# Patient Record
Sex: FEMALE
[De-identification: names, ages, dates, MRNs, and addresses within clinical notes are randomized per-mention and may not be internally consistent; named-entity substitution may affect disease eponyms.]

---

## 2021-06-29 ENCOUNTER — APPOINTMENT (OUTPATIENT)
Dept: OBGYN | Facility: CLINIC | Age: 61
End: 2021-06-29
Payer: MEDICAID

## 2021-06-29 VITALS
DIASTOLIC BLOOD PRESSURE: 80 MMHG | HEIGHT: 65 IN | WEIGHT: 132 LBS | SYSTOLIC BLOOD PRESSURE: 130 MMHG | BODY MASS INDEX: 21.99 KG/M2

## 2021-06-29 DIAGNOSIS — Z01.419 ENCOUNTER FOR GYNECOLOGICAL EXAMINATION (GENERAL) (ROUTINE) W/OUT ABNORMAL FINDINGS: ICD-10-CM

## 2021-06-29 DIAGNOSIS — N76.0 ACUTE VAGINITIS: ICD-10-CM

## 2021-06-29 DIAGNOSIS — Z11.3 ENCOUNTER FOR SCREENING FOR INFECTIONS WITH A PREDOMINANTLY SEXUAL MODE OF TRANSMISSION: ICD-10-CM

## 2021-06-29 DIAGNOSIS — B96.89 ACUTE VAGINITIS: ICD-10-CM

## 2021-06-29 PROCEDURE — 99386 PREV VISIT NEW AGE 40-64: CPT

## 2021-06-29 NOTE — HISTORY OF PRESENT ILLNESS
[Patient reported mammogram was abnormal] : Patient reported mammogram was abnormal [postmenopausal] : postmenopausal [N] : Patient denies prior pregnancies [Mammogramdate] : 4/2021

## 2021-06-29 NOTE — DISCUSSION/SUMMARY
[FreeTextEntry1] : Ms. GISELE CHIRINOS is a 61 year year old who presents today for an Annual Exam. GISELE  has no complaints.\par -referral given for breast imaging, pelvic sonogram&bone density\par -pap smear done\par -vitamine and supplements reviewed\par -she had been advised to follow up in one (1) year.

## 2021-07-08 LAB
C TRACH RRNA SPEC QL NAA+PROBE: NOT DETECTED
CANDIDA VAG CYTO: NOT DETECTED
CYTOLOGY CVX/VAG DOC THIN PREP: ABNORMAL
G VAGINALIS+PREV SP MTYP VAG QL MICRO: NOT DETECTED
N GONORRHOEA RRNA SPEC QL NAA+PROBE: NOT DETECTED
SOURCE AMPLIFICATION: NORMAL
T VAGINALIS VAG QL WET PREP: NOT DETECTED